# Patient Record
Sex: FEMALE | URBAN - METROPOLITAN AREA
[De-identification: names, ages, dates, MRNs, and addresses within clinical notes are randomized per-mention and may not be internally consistent; named-entity substitution may affect disease eponyms.]

---

## 2017-02-10 ENCOUNTER — APPOINTMENT (RX ONLY)
Dept: URBAN - METROPOLITAN AREA CLINIC 116 | Facility: CLINIC | Age: 77
Setting detail: DERMATOLOGY
End: 2017-02-10

## 2017-02-10 DIAGNOSIS — L72.0 EPIDERMAL CYST: ICD-10-CM

## 2017-02-10 DIAGNOSIS — L82.1 OTHER SEBORRHEIC KERATOSIS: ICD-10-CM

## 2017-02-10 DIAGNOSIS — D18.0 HEMANGIOMA: ICD-10-CM

## 2017-02-10 PROBLEM — D18.01 HEMANGIOMA OF SKIN AND SUBCUTANEOUS TISSUE: Status: ACTIVE | Noted: 2017-02-10

## 2017-02-10 PROBLEM — D23.5 OTHER BENIGN NEOPLASM OF SKIN OF TRUNK: Status: ACTIVE | Noted: 2017-02-10

## 2017-02-10 PROCEDURE — 99214 OFFICE O/P EST MOD 30 MIN: CPT | Mod: 25

## 2017-02-10 PROCEDURE — ? ACNE SURGERY

## 2017-02-10 PROCEDURE — ? RECOMMENDATIONS

## 2017-02-10 PROCEDURE — ? COUNSELING

## 2017-02-10 PROCEDURE — 10040 EXTRACTION: CPT

## 2017-02-10 ASSESSMENT — LOCATION ZONE DERM
LOCATION ZONE: TRUNK
LOCATION ZONE: FACE

## 2017-02-10 ASSESSMENT — LOCATION DETAILED DESCRIPTION DERM
LOCATION DETAILED: LEFT SUPERIOR LATERAL MALAR CHEEK
LOCATION DETAILED: MIDDLE STERNUM
LOCATION DETAILED: RIGHT MEDIAL UPPER BACK
LOCATION DETAILED: EPIGASTRIC SKIN
LOCATION DETAILED: RIGHT INFERIOR MEDIAL UPPER BACK

## 2017-02-10 ASSESSMENT — LOCATION SIMPLE DESCRIPTION DERM
LOCATION SIMPLE: CHEST
LOCATION SIMPLE: RIGHT UPPER BACK
LOCATION SIMPLE: ABDOMEN
LOCATION SIMPLE: LEFT CHEEK

## 2017-02-10 NOTE — PROCEDURE: RECOMMENDATIONS
Recommendation Preamble: The following recommendations were made: RCD retinol smoothing serum 10x at bedtime
Detail Level: Simple

## 2017-02-10 NOTE — PROCEDURE: ACNE SURGERY
Prep Text (Optional): Prior to removal the treatment areas were prepped in the usual fashion.
Post-Care Instructions: I reviewed with the patient in detail post-care instructions. Patient is to keep the treatment areaas dry overnight, and then apply bacitracin twice daily until healed. Patient may apply hydrogen peroxide soaks to remove any crusting.
Render Number Of Lesions Treated: no
Extraction Method: 30 gauge needle and comedo extractor
Acne Type: Comedonal Lesions
Detail Level: Simple
Render Post-Care Instructions In Note?: yes
Consent was obtained and risks were reviewed including but not limited to scarring, infection, bleeding, scabbing, incomplete removal, and allergy to anesthesia.
Hemostasis: Aluminum Chloride

## 2019-02-15 ENCOUNTER — APPOINTMENT (RX ONLY)
Dept: URBAN - METROPOLITAN AREA CLINIC 116 | Facility: CLINIC | Age: 79
Setting detail: DERMATOLOGY
End: 2019-02-15

## 2019-02-15 DIAGNOSIS — L82.1 OTHER SEBORRHEIC KERATOSIS: ICD-10-CM

## 2019-02-15 PROBLEM — D23.5 OTHER BENIGN NEOPLASM OF SKIN OF TRUNK: Status: ACTIVE | Noted: 2019-02-15

## 2019-02-15 PROCEDURE — ? COUNSELING

## 2019-02-15 PROCEDURE — 99214 OFFICE O/P EST MOD 30 MIN: CPT

## 2019-02-15 ASSESSMENT — LOCATION SIMPLE DESCRIPTION DERM
LOCATION SIMPLE: LOWER BACK
LOCATION SIMPLE: ABDOMEN

## 2019-02-15 ASSESSMENT — LOCATION ZONE DERM: LOCATION ZONE: TRUNK

## 2019-02-15 ASSESSMENT — LOCATION DETAILED DESCRIPTION DERM
LOCATION DETAILED: SUPERIOR LUMBAR SPINE
LOCATION DETAILED: EPIGASTRIC SKIN

## 2020-02-28 ENCOUNTER — APPOINTMENT (RX ONLY)
Dept: URBAN - METROPOLITAN AREA CLINIC 116 | Facility: CLINIC | Age: 80
Setting detail: DERMATOLOGY
End: 2020-02-28

## 2020-02-28 DIAGNOSIS — L82.1 OTHER SEBORRHEIC KERATOSIS: ICD-10-CM

## 2020-02-28 PROBLEM — D23.5 OTHER BENIGN NEOPLASM OF SKIN OF TRUNK: Status: ACTIVE | Noted: 2020-02-28

## 2020-02-28 PROCEDURE — 99214 OFFICE O/P EST MOD 30 MIN: CPT

## 2020-02-28 PROCEDURE — ? COUNSELING

## 2020-02-28 ASSESSMENT — LOCATION DETAILED DESCRIPTION DERM
LOCATION DETAILED: EPIGASTRIC SKIN
LOCATION DETAILED: RIGHT SUPERIOR MEDIAL MIDBACK

## 2020-02-28 ASSESSMENT — LOCATION SIMPLE DESCRIPTION DERM
LOCATION SIMPLE: ABDOMEN
LOCATION SIMPLE: RIGHT LOWER BACK

## 2020-02-28 ASSESSMENT — LOCATION ZONE DERM: LOCATION ZONE: TRUNK

## 2024-05-26 ENCOUNTER — HOSPITAL ENCOUNTER (OUTPATIENT)
Age: 84
Discharge: HOME | End: 2024-05-26

## 2024-05-26 ENCOUNTER — APPOINTMENT (OUTPATIENT)
Dept: GENERAL RADIOLOGY | Age: 84
End: 2024-05-26
Attending: NURSE PRACTITIONER

## 2024-05-26 VITALS
OXYGEN SATURATION: 98 % | DIASTOLIC BLOOD PRESSURE: 85 MMHG | RESPIRATION RATE: 20 BRPM | SYSTOLIC BLOOD PRESSURE: 141 MMHG | TEMPERATURE: 98 F | HEART RATE: 106 BPM

## 2024-05-26 DIAGNOSIS — J18.9 COMMUNITY ACQUIRED PNEUMONIA, UNSPECIFIED LATERALITY: Primary | ICD-10-CM

## 2024-05-26 DIAGNOSIS — Z20.822 ENCOUNTER FOR LABORATORY TESTING FOR COVID-19 VIRUS: ICD-10-CM

## 2024-05-26 DIAGNOSIS — R05.1 ACUTE COUGH: ICD-10-CM

## 2024-05-26 LAB — SARS-COV-2 RNA RESP QL NAA+PROBE: NOT DETECTED

## 2024-05-26 PROCEDURE — U0002 COVID-19 LAB TEST NON-CDC: HCPCS | Performed by: NURSE PRACTITIONER

## 2024-05-26 PROCEDURE — 99204 OFFICE O/P NEW MOD 45 MIN: CPT | Performed by: NURSE PRACTITIONER

## 2024-05-26 PROCEDURE — 71046 X-RAY EXAM CHEST 2 VIEWS: CPT | Performed by: NURSE PRACTITIONER

## 2024-05-26 RX ORDER — BENZONATATE 200 MG/1
200 CAPSULE ORAL 3 TIMES DAILY PRN
Qty: 15 CAPSULE | Refills: 0 | Status: SHIPPED | OUTPATIENT
Start: 2024-05-26

## 2024-05-26 RX ORDER — LOSARTAN POTASSIUM AND HYDROCHLOROTHIAZIDE 25; 100 MG/1; MG/1
TABLET ORAL
COMMUNITY
Start: 2024-03-10

## 2024-05-26 RX ORDER — ALBUTEROL SULFATE 90 UG/1
2 AEROSOL, METERED RESPIRATORY (INHALATION) EVERY 4 HOURS PRN
Qty: 1 EACH | Refills: 0 | Status: SHIPPED | OUTPATIENT
Start: 2024-05-26 | End: 2024-06-25

## 2024-05-26 RX ORDER — AMOXICILLIN 500 MG/1
1000 TABLET, FILM COATED ORAL 3 TIMES DAILY
Qty: 42 TABLET | Refills: 0 | Status: SHIPPED | OUTPATIENT
Start: 2024-05-26 | End: 2024-06-02

## 2024-05-26 RX ORDER — LEVOTHYROXINE SODIUM 112 UG/1
TABLET ORAL
COMMUNITY
Start: 2024-03-19

## 2024-05-26 RX ORDER — ROSUVASTATIN CALCIUM 10 MG/1
TABLET, COATED ORAL
COMMUNITY
Start: 2024-03-10

## 2024-05-26 NOTE — ED PROVIDER NOTES
Patient Seen in: [facility]      History     Chief Complaint   Patient presents with    Cough/URI     Stated Complaint: Cough    Subjective:   HPI  Patient is an 83-year-old female that presents to the immediate care center today with concern for fever, chills, cough, congestion that started 1 week ago after traveling from [place]. Pt granddaughter recently had a cough. Pt. has been eating and drinking without difficulty.  She has had no shortness of air; no abdominal or back pain; no headache or dizziness.        Objective:   History reviewed. No pertinent past medical history.           History reviewed. No pertinent surgical history.             Social History     Socioeconomic History    Marital status: Single   Tobacco Use    Smoking status: Never    Smokeless tobacco: Never   Vaping Use    Vaping status: Never Used   Substance and Sexual Activity    Alcohol use: Never    Drug use: Never              Review of Systems   Constitutional:  Negative for appetite change, chills and fever.   HENT:  Positive for congestion and sinus pressure.    Respiratory:  Positive for cough. Negative for shortness of breath and wheezing.    Gastrointestinal:  Negative for abdominal pain.   Musculoskeletal:  Negative for arthralgias and myalgias.   Skin:  Negative for rash.   Neurological:  Negative for dizziness, weakness and headaches.       Positive for stated complaint: Cough  Other systems are as noted in HPI.  Constitutional and vital signs reviewed.      All other systems reviewed and negative except as noted above.    Physical Exam     ED Triage Vitals [05/26/24 1403]   /85   Pulse 106   Resp 20   Temp 98 °F (36.7 °C)   Temp src Temporal   SpO2 98 %   O2 Device None (Room air)       Current Vitals:   Vital Signs  BP: 141/85  Pulse: 106  Resp: 20  Temp: 98 °F (36.7 °C)  Temp src: Temporal    Oxygen Therapy  SpO2: 98 %  O2 Device: None (Room air)            Physical Exam  Vitals and nursing note reviewed.    Constitutional:       General: She is not in acute distress.     Appearance: She is not ill-appearing.   HENT:      Right Ear: Tympanic membrane and ear canal normal.      Left Ear: Tympanic membrane and ear canal normal.      Nose: Nose normal.   Eyes:      Conjunctiva/sclera: Conjunctivae normal.   Pulmonary:      Effort: Pulmonary effort is normal. No respiratory distress.      Breath sounds: Normal breath sounds.   Musculoskeletal:      Cervical back: Normal range of motion and neck supple.   Skin:     General: Skin is warm and dry.      Findings: No rash.   Neurological:      Mental Status: She is alert and oriented to person, place, and time.               ED Course     Labs Reviewed   RAPID SARS-COV-2 BY PCR - Normal     XR CHEST PA + LAT CHEST (CPT=71046)   Final Result   PROCEDURE: XR CHEST PA + LAT CHEST (CPT=71046)       COMPARISON: None available.       INDICATIONS: Worsening productive cough for the last week.       TECHNIQUE:   Two views.         FINDINGS:    CARDIAC/VASC: The cardiomediastinal silhouette is not enlarged. There is    mild tortuosity of the thoracic aorta with peripheral atherosclerotic    vascular calcification. The pulmonary vascularity is within normal limits.       MEDIAST/QUINTON: No visible mass or adenopathy.    LUNGS/PLEURA: Basilar reticular opacities are demonstrated and may be    atelectatic in nature. Elevation right hemidiaphragm is seen with slight    eventration. No airspace consolidation, pleural effusion, or pneumothorax    is evident.     BONES: Mild dextroscoliosis of the midthoracic spine is seen with    levoscoliosis of the lumbar spine. Multilevel degenerative changes of the    thoracic spine are apparent. There are degenerative changes of shoulders    bilaterally.                       =====   CONCLUSION:    Scattered basilar reticular opacities are likely atelectatic in nature.               Dictated by (CST): Xavier Conner MD on 5/26/2024 at 3:00 PM         Finalized by (CST): Xavier Conner MD on 5/26/2024 at 3:01 PM                                  MDM      Radiographic findings reviewed with patient bedside prior to disposition.  She was informed that we will treat these opacities as potential infection.  She was provided prescription for amoxicillin to complete as directed.  She was informed that is important for her to follow with primary care provider next week to ensure successful treatment.  She states her primary care provider is in [place].  If it anytime her symptoms worsen: She becomes short of breath, have headache or dizziness, generalized weakness, is unable to take her medication for any reason, she should seek urgent reevaluation in the emergency department.  She was provided with information for local primary care provider.  She states understanding and agrees with plan.                                   Medical Decision Making  Differential diagnoses considered included, but are not exclusive of: bacterial vs viral sinusitis, dehydration, pneumonia, influenza, Covid-19 infection, and other viral upper respiratory infection.       Problems Addressed:  Community acquired pneumonia, unspecified laterality: undiagnosed new problem with uncertain prognosis    Amount and/or Complexity of Data Reviewed  Radiology:  Decision-making details documented in ED Course.    Risk  OTC drugs.  Prescription drug management.        Disposition and Plan     Clinical Impression:  1. Community acquired pneumonia, unspecified laterality    2. Encounter for laboratory testing for COVID-19 virus    3. Acute cough         Disposition:  Discharge  5/26/2024  3:10 pm    Follow-up:  Elizabeth Graf MD  [address] [address]  [phone]    Schedule an appointment as soon as possible for a visit in 1 week      [facility]  [address] [facility] [id number] [phone]  Go to   If symptoms worsen          Medications  Prescribed:  Discharge Medication List as of 5/26/2024  3:17 PM        START taking these medications    Details   amoxicillin 500 MG Oral Tab Take 2 tablets (1,000 mg total) by mouth 3 (three) times daily for 7 days., Normal, Disp-42 tablet, R-0      albuterol 108 (90 Base) MCG/ACT Inhalation Aero Soln Inhale 2 puffs into the lungs every 4 (four) hours as needed for Wheezing., Normal, Disp-1 each, R-0      Spacer/Aero-Hold Chamber Mask Does not apply Misc Use with albuterol inhaler, Normal, Disp-1 each, R-0      benzonatate 200 MG Oral Cap Take 1 capsule (200 mg total) by mouth 3 (three) times daily as needed for cough., Normal, Disp-15 capsule, R-0

## 2024-05-26 NOTE — ED INITIAL ASSESSMENT (HPI)
Pt here with complaints of productive cough , sinus pressure and headache, pt denies any fevers or sob

## 2024-06-04 ENCOUNTER — OFFICE VISIT (OUTPATIENT)
Facility: CLINIC | Age: 84
End: 2024-06-04
Payer: MEDICARE

## 2024-06-04 VITALS
WEIGHT: 164.5 LBS | SYSTOLIC BLOOD PRESSURE: 118 MMHG | BODY MASS INDEX: 31.46 KG/M2 | DIASTOLIC BLOOD PRESSURE: 70 MMHG | OXYGEN SATURATION: 96 % | HEART RATE: 97 BPM | HEIGHT: 60.63 IN

## 2024-06-04 DIAGNOSIS — J18.9 PNEUMONIA OF BOTH LOWER LOBES DUE TO INFECTIOUS ORGANISM: Primary | ICD-10-CM

## 2024-06-04 DIAGNOSIS — R01.1 SYSTOLIC MURMUR: ICD-10-CM

## 2024-06-04 DIAGNOSIS — Z71.85 VACCINE COUNSELING: ICD-10-CM

## 2024-06-04 PROBLEM — M48.00 SPINAL STENOSIS: Status: ACTIVE | Noted: 2024-06-04

## 2024-06-04 PROBLEM — E03.9 HYPOTHYROIDISM: Status: ACTIVE | Noted: 2024-06-04

## 2024-06-04 PROBLEM — E55.9 VITAMIN D DEFICIENCY: Status: ACTIVE | Noted: 2024-06-04

## 2024-06-04 PROBLEM — M85.80 OSTEOPENIA: Status: ACTIVE | Noted: 2024-06-04

## 2024-06-04 PROBLEM — R73.03 PREDIABETES: Status: ACTIVE | Noted: 2024-06-04

## 2024-06-04 PROBLEM — I10 HYPERTENSION: Status: ACTIVE | Noted: 2024-06-04

## 2024-06-04 PROBLEM — E78.2 MIXED HYPERLIPIDEMIA: Status: ACTIVE | Noted: 2024-06-04

## 2024-06-04 PROCEDURE — 99203 OFFICE O/P NEW LOW 30 MIN: CPT | Performed by: FAMILY MEDICINE

## 2024-06-04 RX ORDER — INHALER,ASSIST DEVICE,LG MASK
SPACER (EA) MISCELLANEOUS
COMMUNITY
Start: 2024-05-26

## 2024-06-04 NOTE — PROGRESS NOTES
Subjective:   Que Cartwright is a 83 year old female who presents for Establish Care (Patient is here to establish care. ) and Urgent Care F/u (Patient went to urgent care on 5/26/24 due to having URI. Patient states she was diagnosed with pneumonia. )     Patient presents to establish care and follow up from urgent care on 5/26/24. Was treated for potential bibasilar pneumonia. Started on amoxicillin. Finished antibiotics 2 days ago. Patient does feel improved compared to when was seen in urgent care. Still having productive cough. Feels drainage coming more from upper respiratory rather than lungs. Does continue use albuterol inhaler sparingly.     Patient lives in [place]. Usually in [place] from June to October. Lives rest of year in [place] and has PCP in [place].    Heart murmur is known to patient PCP following    Health Maintenance  Has had DEXA with PCP that showed osteopenia  Has had shingles and Pneumonia vaccines. Does not think has had Prevnar 20.     History/Other:    Chief Complaint Reviewed and Verified  Nursing Notes Reviewed and   Verified  Tobacco Reviewed  Allergies Reviewed  Medications Reviewed    Problem List Reviewed  Medical History Reviewed  Surgical History   Reviewed  Family History Reviewed  Social History Reviewed         Tobacco:  She has never smoked tobacco.    Current Outpatient Medications   Medication Sig Dispense Refill    Spacer/Aero-Holding Chambers (OPTICHAMBER JAMES-LG MASK) Does not apply Device AS DIRECTED WITH ALBUTEROL INHALER      metFORMIN 500 MG Oral Tab       rosuvastatin 10 MG Oral Tab       losartan-hydroCHLOROthiazide 100-25 MG Oral Tab       levothyroxine 112 MCG Oral Tab       albuterol 108 (90 Base) MCG/ACT Inhalation Aero Soln Inhale 2 puffs into the lungs every 4 (four) hours as needed for Wheezing. 1 each 0    Spacer/Aero-Hold Chamber Mask Does not apply Misc Use with albuterol inhaler 1 each 0         Review of Systems:  Review of Systems    Constitutional: Negative.    HENT:  Positive for postnasal drip.    Eyes: Negative.    Respiratory:  Positive for cough.    Cardiovascular: Negative.    Gastrointestinal: Negative.    Genitourinary: Negative.    Musculoskeletal: Negative.    Skin: Negative.    Neurological: Negative.    Psychiatric/Behavioral: Negative.         Objective:   /70 (BP Location: Left arm, Patient Position: Sitting, Cuff Size: adult)   Pulse 97   Ht 5' 0.63\" (1.54 m)   Wt 164 lb 8 oz (74.6 kg)   SpO2 96%   BMI 31.46 kg/m²  Estimated body mass index is 31.46 kg/m² as calculated from the following:    Height as of this encounter: 5' 0.63\" (1.54 m).    Weight as of this encounter: 164 lb 8 oz (74.6 kg).  Physical Exam  Vitals and nursing note reviewed.   Constitutional:       General: She is not in acute distress.     Appearance: Normal appearance.   HENT:      Head: Normocephalic and atraumatic.      Right Ear: Tympanic membrane normal.      Left Ear: Tympanic membrane normal.      Nose:      Right Sinus: No maxillary sinus tenderness or frontal sinus tenderness.      Left Sinus: No maxillary sinus tenderness or frontal sinus tenderness.      Mouth/Throat:      Mouth: Mucous membranes are moist.      Pharynx: Oropharynx is clear. No oropharyngeal exudate or posterior oropharyngeal erythema.   Eyes:      General:         Right eye: No discharge.         Left eye: No discharge.      Comments: Extraocular eye movements grossly intact   Cardiovascular:      Rate and Rhythm: Normal rate and regular rhythm.      Heart sounds: Murmur (holosystolic heard loudest at right second intercostal space, grade 2) heard.   Pulmonary:      Effort: Pulmonary effort is normal.   Musculoskeletal:      Comments: Normal gait   Skin:     Findings: No rash.   Neurological:      General: No focal deficit present.      Mental Status: She is alert. Mental status is at baseline.   Psychiatric:         Mood and Affect: Mood normal.         Behavior:  Behavior normal.         Assessment & Plan:   1. Pneumonia of both lower lobes due to infectious organism (Primary)  -recovering as expected. To continue to use albuterol as needed for cough. Counseled patient to add Claritin for likely postnasal drip.     2. Systolic murmur  -known to patient and PCP    3. Vaccine counseling  Discussed that may consider getting prevnar 20 if has been 5 years since last pneumonia vaccination    Return if symptoms worsen or fail to improve.    Elizabeth Graf MD, 6/4/2024, 2:17 PM

## 2024-09-24 ENCOUNTER — TELEPHONE (OUTPATIENT)
Facility: CLINIC | Age: 84
End: 2024-09-24

## 2024-09-24 NOTE — TELEPHONE ENCOUNTER
Outgoing call to patient to set up her yearly physical. Patient stated that she is at the dentist office and will call back.     No action is required at this time

## 2025-01-31 ENCOUNTER — TELEPHONE (OUTPATIENT)
Facility: CLINIC | Age: 85
End: 2025-01-31

## 2025-04-22 ENCOUNTER — TELEPHONE (OUTPATIENT)
Facility: CLINIC | Age: 85
End: 2025-04-22

## 2025-04-22 NOTE — TELEPHONE ENCOUNTER
Outgoing call to patient to assist in scheduling AWV. Patient stated she has is out of town and will be back sometime in June. This PSR informed patient someone will give her a call then. Patient agreed.